# Patient Record
Sex: MALE | URBAN - NONMETROPOLITAN AREA
[De-identification: names, ages, dates, MRNs, and addresses within clinical notes are randomized per-mention and may not be internally consistent; named-entity substitution may affect disease eponyms.]

---

## 2016-03-09 PROBLEM — H25.811: Noted: 2020-03-13

## 2020-03-13 ENCOUNTER — IMPORTED ENCOUNTER (OUTPATIENT)
Dept: URBAN - NONMETROPOLITAN AREA CLINIC 1 | Facility: CLINIC | Age: 60
End: 2020-03-13

## 2020-03-13 PROCEDURE — 92004 COMPRE OPH EXAM NEW PT 1/>: CPT

## 2020-03-13 NOTE — PATIENT DISCUSSION
Cataract(s)-Visually significant cataract OD . -Cataract(s) causing symptomatic impairment of visual function not correctable with a tolerable change in glasses or contact lenses lighting or non-operative means resulting in specific activity limitations and/or participation restrictions including but not limited to reading viewing television driving or meeting vocational or recreational needs. -Expectation is clearer vision and functional improvement in symptoms as well as reduced glare disability after cataract removal.-Order IOLMaster and OPD today. -Recommend traditional surgery with Toric implant based on today's OPD testing and lifestyle questionnaire. -All questions were answered regarding surgery including pre and post-op medications appointments activity restrictions and anesthetic usage.-The risks benefits and alternatives and special risk factors for the patient were discussed in detail including but not limited to: bleeding infection retinal detachment vitreous loss problems with the implant and possible need for additional surgery.-Although rare the possibility of complete vision loss was discussed.-The possible need for glasses post-operatively was discussed.-Order medical clearance exam based on history of  DM-Patient elects to proceed with cataract surgery OD . Complex trypan extra provisc extra viscoat.; Dr's Notes: NC per Estefani Osman for his fees for surgery  Dr. Tegan Johns to talk to Dr. Neil Apt about results LRI OS Complimentary  per [employer] will pay implant fee. Insurance only/no copay for OD per Estefani Osman. JS TO TALK TO DR Bob Ha.  PT TO DECIDE IF HE WANTS SX ON THE RIGHT EYE HERE OR [place] NOT [place]IT PER Estefani Osman

## 2020-10-23 ENCOUNTER — IMPORTED ENCOUNTER (OUTPATIENT)
Dept: URBAN - NONMETROPOLITAN AREA CLINIC 1 | Facility: CLINIC | Age: 60
End: 2020-10-23

## 2020-10-23 NOTE — PATIENT DISCUSSION
Cataract(s)-Visually significant cataract OD . -Cataract(s) causing symptomatic impairment of visual function not correctable with a tolerable change in glasses or contact lenses lighting or non-operative means resulting in specific activity limitations and/or participation restrictions including but not limited to reading viewing television driving or meeting vocational or recreational needs. -Expectation is clearer vision and functional improvement in symptoms as well as reduced glare disability after cataract removal.-Order IOLMaster and OPD today. -Recommend traditional surgery with Toric implant based on today's OPD testing and lifestyle questionnaire. -All questions were answered regarding surgery including pre and post-op medications appointments activity restrictions and anesthetic usage.-The risks benefits and alternatives and special risk factors for the patient were discussed in detail including but not limited to: bleeding infection retinal detachment vitreous loss problems with the implant and possible need for additional surgery.-Although rare the possibility of complete vision loss was discussed.-The possible need for glasses post-operatively was discussed.-Order medical clearance exam based on history of  DM-Patient elects to proceed with cataract surgery OD . Complex trypan extra provisc extra viscoat.; Dr's Notes: NC per Liborio Armas for his fees for surgery  Dr. David Lan to talk to Dr. Annalisa aPul about results LRI OS Complimentary  per [employer] will pay implant fee. Insurance only/no copay for OD per Liborio Armas. JS TO TALK TO DR Robert Ash.  PT TO DECIDE IF HE WANTS SX ON THE RIGHT EYE HERE OR [place] NOT TO WAIT PER Liborio Armas

## 2020-11-02 PROBLEM — H18.40: Noted: 2020-11-02

## 2020-11-02 PROBLEM — Z96.1: Noted: 2020-11-02

## 2020-11-02 PROBLEM — H25.811: Noted: 2020-11-02

## 2020-11-02 PROBLEM — E11.9: Noted: 2020-11-02

## 2020-11-03 ENCOUNTER — IMPORTED ENCOUNTER (OUTPATIENT)
Dept: URBAN - NONMETROPOLITAN AREA CLINIC 1 | Facility: CLINIC | Age: 60
End: 2020-11-03

## 2020-11-03 PROBLEM — E11.9: Noted: 2020-11-03

## 2020-11-03 PROBLEM — H25.811: Noted: 2020-11-03

## 2020-11-03 PROBLEM — Z01.818: Noted: 2020-11-03

## 2020-11-03 NOTE — PATIENT DISCUSSION
Medical Clearance-Medical clearance done today. -No outstanding concerns that would preclude surgery.-Patient is cleared to proceed with scheduled surgery.; Dr's Notes: NC per Yvette Blizzard for his fees for surgery  Dr. Cristie Fothergill to talk to Dr. Andrez Day about results LRI OS Complimentary  per [employer] will pay implant fee. Insurance only/no copay for OD per Yvette Blizzard. JS TO TALK TO DR Kota Fletcher.  PT TO DECIDE IF HE WANTS SX ON THE RIGHT EYE HERE OR [place] NOT TO WAIT PER Yvette Blizzard

## 2020-11-13 ENCOUNTER — IMPORTED ENCOUNTER (OUTPATIENT)
Dept: URBAN - NONMETROPOLITAN AREA CLINIC 1 | Facility: CLINIC | Age: 60
End: 2020-11-13

## 2020-11-13 PROBLEM — Z98.41: Noted: 2020-11-13

## 2020-11-13 PROBLEM — Z96.1: Noted: 2020-11-13

## 2020-11-13 PROCEDURE — 99024 POSTOP FOLLOW-UP VISIT: CPT

## 2020-11-13 NOTE — PATIENT DISCUSSION
s/p PCIOL 1 Day POV CE OD Toric/Trad 11/12/20-Pt doing well s/p PCIOL. -Continue post-op gtts according to instruction sheet and sleep with eye shield over eye for 7 nights.-Avoid bending at the waist lifting anything over 5lbs and dirty or natan environments. Samples given today. IF patient needs any more samples my staff will provide him with whatever he needs. -RTC 1 week POV Dr. Feroz Joseph. s/p PCIOL OS w/ PCO OS Intraocular lens in place Clear lens no treatment needed PCO noted but no treatment needed at this time. will evaluate at 3months Continue to monitor; Dr's Notes: NC per [address] [place] [address] for his fees for surgery  Dr. Kin Welsh to talk to Dr. Mitesh Gonzalez about results LRI OS Complimentary  per [employer] will pay implant fee. Insurance only/no copay for OD per [address] [place] [address]. JS TO TALK TO DR Jesse Ambriz.  PT TO DECIDE IF HE [facility] [address] [place] [address]

## 2020-11-20 ENCOUNTER — IMPORTED ENCOUNTER (OUTPATIENT)
Dept: URBAN - NONMETROPOLITAN AREA CLINIC 1 | Facility: CLINIC | Age: 60
End: 2020-11-20

## 2020-11-20 PROCEDURE — 99024 POSTOP FOLLOW-UP VISIT: CPT

## 2020-11-20 NOTE — PATIENT DISCUSSION
s/p PCIOL-Pt doing well at 1 week s/p PCIOL. -Continue post-op gtts according to instruction sheet.-Okay to resume usual activites and d/c eye shield.; 's Notes: NC per [address] [place] [address] for his fees for surgery  Dr. Lulu Tan to talk to Dr. Randee Arana about results LRI OS Complimentary  per [employer] will pay implant fee. Insurance only/no copay for OD per [address] [place] [address]. JS TO TALK TO DR Ravin Valderrama.  PT TO DECIDE IF HE WANTS SX ON THE RIGHT EYE HERE OR [place] NOT TO WAIT PER [address] [place] [address]

## 2021-01-15 ENCOUNTER — IMPORTED ENCOUNTER (OUTPATIENT)
Dept: URBAN - NONMETROPOLITAN AREA CLINIC 1 | Facility: CLINIC | Age: 61
End: 2021-01-15

## 2021-01-15 PROBLEM — E11.9: Noted: 2021-01-15

## 2021-01-15 PROBLEM — H18.40: Noted: 2021-01-15

## 2021-01-15 PROBLEM — Z96.1: Noted: 2021-01-15

## 2021-01-15 PROCEDURE — 99212 OFFICE O/P EST SF 10 MIN: CPT

## 2021-01-15 NOTE — PATIENT DISCUSSION
Pseduophakia OU Stable on todays exam w/ intraocular lens in place OU Pt given +2.50 otc readers today to use for near vision Pt happy with visionNo further treatment needed at this time Continue to monitor DM Discussed diagnosis w. patient Reports stable sugars Stressed importance of good blood sugar control No BDR noted on todays exam Continue tomonitor; Dr's Notes: NC per Cinthia Arreola for his fees for surgery  Dr. Linda Thurman to talk to Dr. Cosme Torre about results LRI OS Complimentary  per [employer] will pay implant fee. Insurance only/no copay for OD per Cinthia Arreola. JS TO TALK TO DR Bruce Calvo.  PT TO DECIDE IF HE WANTS SX ON THE RIGHT EYE HERE OR [place] NOT TO WAIT PER Cinthia Arreola

## 2022-04-16 ASSESSMENT — TONOMETRY
OD_IOP_MMHG: 15
OS_IOP_MMHG: 13
OD_IOP_MMHG: 22
OS_IOP_MMHG: 15
OD_IOP_MMHG: 15
OD_IOP_MMHG: 13
OS_IOP_MMHG: 15

## 2022-04-16 ASSESSMENT — VISUAL ACUITY
OS_GLARE: 20/30
OS_CC: 20/30
OS_PH: 20/30
OD_GLARE: 20/100
OD_CC: 20/25
OU_CC: J1+
OD_CC: CF2'
OS_CC: 20/30
OS_PH: 20/25
OS_CC: 20/40
OS_CC: 20/30
OD_PH: 20/200
OD_PAM: 20/30
OD_CC: CF2'
OD_CC: 20/20-1
OD_CC: 20/30
OD_CC: 20/20
OD_PH: 20/60

## 2024-07-03 ENCOUNTER — OFFICE VISIT (OUTPATIENT)
Age: 64
End: 2024-07-03
Payer: COMMERCIAL

## 2024-07-03 VITALS
WEIGHT: 203 LBS | DIASTOLIC BLOOD PRESSURE: 84 MMHG | OXYGEN SATURATION: 99 % | SYSTOLIC BLOOD PRESSURE: 144 MMHG | HEART RATE: 125 BPM

## 2024-07-03 DIAGNOSIS — R00.0 TACHYCARDIA: ICD-10-CM

## 2024-07-03 DIAGNOSIS — I45.4 IVCD (INTRAVENTRICULAR CONDUCTION DEFECT): ICD-10-CM

## 2024-07-03 DIAGNOSIS — R03.0 PREHYPERTENSION: ICD-10-CM

## 2024-07-03 DIAGNOSIS — R01.1 MURMUR, CARDIAC: ICD-10-CM

## 2024-07-03 DIAGNOSIS — R03.0 PREHYPERTENSION: Primary | ICD-10-CM

## 2024-07-03 DIAGNOSIS — R42 DIZZINESS: ICD-10-CM

## 2024-07-03 DIAGNOSIS — E11.9 TYPE 2 DIABETES MELLITUS WITHOUT COMPLICATION, WITHOUT LONG-TERM CURRENT USE OF INSULIN (HCC): ICD-10-CM

## 2024-07-03 PROCEDURE — 93000 ELECTROCARDIOGRAM COMPLETE: CPT | Performed by: INTERNAL MEDICINE

## 2024-07-03 PROCEDURE — 99204 OFFICE O/P NEW MOD 45 MIN: CPT | Performed by: INTERNAL MEDICINE

## 2024-07-03 RX ORDER — ASPIRIN 81 MG/1
81 TABLET ORAL DAILY
Qty: 90 TABLET | Refills: 0 | Status: SHIPPED | OUTPATIENT
Start: 2024-07-03

## 2024-07-03 ASSESSMENT — PATIENT HEALTH QUESTIONNAIRE - PHQ9
SUM OF ALL RESPONSES TO PHQ QUESTIONS 1-9: 0
2. FEELING DOWN, DEPRESSED OR HOPELESS: NOT AT ALL
SUM OF ALL RESPONSES TO PHQ9 QUESTIONS 1 & 2: 0
SUM OF ALL RESPONSES TO PHQ QUESTIONS 1-9: 0
DEPRESSION UNABLE TO ASSESS: FUNCTIONAL CAPACITY MOTIVATION LIMITS ACCURACY
SUM OF ALL RESPONSES TO PHQ QUESTIONS 1-9: 0
SUM OF ALL RESPONSES TO PHQ QUESTIONS 1-9: 0
1. LITTLE INTEREST OR PLEASURE IN DOING THINGS: NOT AT ALL

## 2024-07-03 ASSESSMENT — ENCOUNTER SYMPTOMS
EYES NEGATIVE: 1
GASTROINTESTINAL NEGATIVE: 1
RESPIRATORY NEGATIVE: 1

## 2024-07-03 NOTE — PROGRESS NOTES
Extended cardiac holter monitor (3 days - 15 days); Future  -     TSH; Future    IVCD (intraventricular conduction defect)  -     aspirin 81 MG EC tablet; Take 1 tablet by mouth daily    Murmur, cardiac  -     Echo (TTE) complete (PRN contrast/bubble/strain/3D); Future    Type 2 diabetes mellitus without complication, without long-term current use of insulin (HCC)  -     Hepatic Function Panel; Future  -     Basic Metabolic Panel; Future  -     CBC; Future  -     Lipid Panel; Future  -     TSH; Future  -     Hemoglobin A1C; Future          See patient instructions also for other medical advice given    There are no discontinued medications.    Follow-up and Dispositions    Return in about 4 weeks (around 7/31/2024), or if symptoms worsen or fail to improve, for post test/procedure.           Return to ER if any significant CP not relieved by s/l NTG, severe SOB, severe palpitations, loss of consciousness    7/3/2024  Plan for medicines : History of tachycardia today and bradycardia few days ago when blood pressure was not recordable for a couple of hours.  He had salty water and blood pressure increased at home and did not call 911.  EKG shows IVCD and likely sinus tachycardia with first-degree AV block.  He may have sick sinus syndrome.  Has a murmur with possible mitral regurgitation.  Check Holter and echo.  Routine labs including lipids and A1c as well as BMP LFT TSH and CBC.  Will discuss with PCP also.  Exercise Plan: Moderate activity and avoid heavy exercise till testing is done.  Diet plan: Mediterranean diet guidelines explained     Recommend that he should go to ER if he develops significant bradycardia or blood pressure becomes low.

## 2024-07-15 PROBLEM — R00.1 SYMPTOMATIC BRADYCARDIA: Status: ACTIVE | Noted: 2024-07-15

## 2024-07-15 PROBLEM — I44.2 THIRD DEGREE HEART BLOCK (HCC): Status: ACTIVE | Noted: 2024-07-15

## 2024-08-02 ENCOUNTER — OFFICE VISIT (OUTPATIENT)
Age: 64
End: 2024-08-02

## 2024-08-02 VITALS
BODY MASS INDEX: 30.46 KG/M2 | HEART RATE: 81 BPM | DIASTOLIC BLOOD PRESSURE: 65 MMHG | OXYGEN SATURATION: 99 % | SYSTOLIC BLOOD PRESSURE: 132 MMHG | WEIGHT: 201 LBS | HEIGHT: 68 IN

## 2024-08-02 DIAGNOSIS — I44.2 COMPLETE HEART BLOCK (HCC): ICD-10-CM

## 2024-08-02 DIAGNOSIS — I48.92 ATRIAL FLUTTER, UNSPECIFIED TYPE (HCC): Primary | ICD-10-CM

## 2024-08-02 DIAGNOSIS — E11.9 TYPE 2 DIABETES MELLITUS WITHOUT COMPLICATION, WITHOUT LONG-TERM CURRENT USE OF INSULIN (HCC): ICD-10-CM

## 2024-08-02 DIAGNOSIS — I10 PRIMARY HYPERTENSION: ICD-10-CM

## 2024-08-02 DIAGNOSIS — Z95.0 CARDIAC PACEMAKER IN SITU: ICD-10-CM

## 2024-08-02 ASSESSMENT — PATIENT HEALTH QUESTIONNAIRE - PHQ9
SUM OF ALL RESPONSES TO PHQ QUESTIONS 1-9: 0
DEPRESSION UNABLE TO ASSESS: FUNCTIONAL CAPACITY MOTIVATION LIMITS ACCURACY
SUM OF ALL RESPONSES TO PHQ QUESTIONS 1-9: 0
1. LITTLE INTEREST OR PLEASURE IN DOING THINGS: NOT AT ALL
SUM OF ALL RESPONSES TO PHQ9 QUESTIONS 1 & 2: 0
2. FEELING DOWN, DEPRESSED OR HOPELESS: NOT AT ALL

## 2024-08-02 ASSESSMENT — ENCOUNTER SYMPTOMS
GASTROINTESTINAL NEGATIVE: 1
EYES NEGATIVE: 1
RESPIRATORY NEGATIVE: 1

## 2024-08-02 NOTE — PROGRESS NOTES
1. Have you been to the ER, urgent care clinic since your last visit?  Hospitalized since your last visit?     No      2.  Where do you normally have your labs drawn?   PCP    3. Do you need any refills today?   No    4. Which local pharmacy do you use (enter pharmacy)?   [facility]    5. Which mail order pharmacy do you use (enter pharmacy)?   No     6. Are you here for surgical clearance and if so who will be doing your     procedure/surgery (care team)?   No      
\"VLDL\"        7/3/2024  Plan for medicines : History of tachycardia today and bradycardia few days ago when blood pressure was not recordable for a couple of hours.  He had salty water and blood pressure increased at home and did not call 911.  EKG shows IVCD and likely sinus tachycardia with first-degree AV block.  He may have sick sinus syndrome.  Has a murmur with possible mitral regurgitation.  Check Holter and echo.  Routine labs including lipids and A1c as well as BMP LFT TSH and CBC.  Will discuss with PCP also.  Exercise Plan: Moderate activity and avoid heavy exercise till testing is done.  Diet plan: Mediterranean diet guidelines explained     Recommend that he should go to ER if he develops significant bradycardia or blood pressure becomes low.    Ej was seen today for follow-up.    Diagnoses and all orders for this visit:    Atrial flutter, unspecified type (HCC)    Complete heart block (HCC)    Cardiac pacemaker in situ  -     PROGRAM EVAL (IN PERSON) IMPLANT DEVICE,PACEMAKER,MULT LEAD    Type 2 diabetes mellitus without complication, without long-term current use of insulin (HCC)    Primary hypertension          See patient instructions also for other medical advice given    There are no discontinued medications.    Follow-up and Dispositions    Return in about 3 months (around 11/2/2024), or if symptoms worsen or fail to improve.           Return to ER if any significant CP not relieved by s/l NTG, severe SOB, severe palpitations, loss of consciousness    8/2/2024  Plan for medicines : Continue current cardiac medications.  Continue anticoagulation.  Will watch for recurrence of atrial flutter and consider antiarrhythmics in future if needed.  Blood pressure is controlled.  Exercise Plan: Regular activity walking 30 to 40 minutes a day  Diet plan: Mediterranean diet guidelines reinforced.

## 2024-11-25 ENCOUNTER — OFFICE VISIT (OUTPATIENT)
Age: 64
End: 2024-11-25

## 2024-11-25 VITALS
OXYGEN SATURATION: 98 % | HEART RATE: 94 BPM | BODY MASS INDEX: 29.1 KG/M2 | DIASTOLIC BLOOD PRESSURE: 86 MMHG | WEIGHT: 192 LBS | SYSTOLIC BLOOD PRESSURE: 151 MMHG | HEIGHT: 68 IN

## 2024-11-25 DIAGNOSIS — I49.5 SSS (SICK SINUS SYNDROME) (HCC): ICD-10-CM

## 2024-11-25 DIAGNOSIS — I25.10 CORONARY ARTERY DISEASE INVOLVING NATIVE CORONARY ARTERY OF NATIVE HEART WITHOUT ANGINA PECTORIS: ICD-10-CM

## 2024-11-25 DIAGNOSIS — I44.2 COMPLETE HEART BLOCK (HCC): ICD-10-CM

## 2024-11-25 DIAGNOSIS — I48.92 ATRIAL FLUTTER, UNSPECIFIED TYPE (HCC): ICD-10-CM

## 2024-11-25 DIAGNOSIS — I10 PRIMARY HYPERTENSION: ICD-10-CM

## 2024-11-25 DIAGNOSIS — Z95.0 CARDIAC PACEMAKER IN SITU: Primary | ICD-10-CM

## 2024-11-25 PROCEDURE — 93296 REM INTERROG EVL PM/IDS: CPT | Performed by: INTERNAL MEDICINE

## 2024-11-25 PROCEDURE — 93294 REM INTERROG EVL PM/LDLS PM: CPT | Performed by: INTERNAL MEDICINE

## 2024-11-25 RX ORDER — METOPROLOL SUCCINATE 25 MG/1
25 TABLET, EXTENDED RELEASE ORAL DAILY
Qty: 90 TABLET | Refills: 1 | Status: SHIPPED | OUTPATIENT
Start: 2024-11-25 | End: 2024-11-25 | Stop reason: SDUPTHER

## 2024-11-25 RX ORDER — METOPROLOL SUCCINATE 25 MG/1
25 TABLET, EXTENDED RELEASE ORAL DAILY
Qty: 90 TABLET | Refills: 1 | Status: SHIPPED | OUTPATIENT
Start: 2024-11-25

## 2024-11-25 RX ORDER — ATORVASTATIN CALCIUM 10 MG/1
10 TABLET, FILM COATED ORAL DAILY
Qty: 90 TABLET | Refills: 3 | Status: SHIPPED | OUTPATIENT
Start: 2024-11-25

## 2024-11-25 ASSESSMENT — ENCOUNTER SYMPTOMS
EYES NEGATIVE: 1
RESPIRATORY NEGATIVE: 1
GASTROINTESTINAL NEGATIVE: 1

## 2024-11-25 NOTE — PROGRESS NOTES
Ej Collier is a 63 y.o. year old male.    7/3/2023 seen as a new patient for bradycardia and low blood pressure.  He is a friend of mine who called with heart rate in 30s and blood pressure was not recordable at home.  He had cold hands and feet but did not feel any chest pain short of breath.  Salt water was tried and after some time heart rate went up to 100 and blood pressure increased to 150s/70s. Dizziness on standing occasionally once in a couple of months since he had COVID 2021.  Seems to be better since he started eating some food more regularly.  No chest pain shortness of breath.  8/2/2024 had to go to ER because of severe bradycardia and found to have atrial flutter with complete heart block.  Received permanent pacemaker and has been asymptomatic since.  Wound is healing well.  Discharge summary from [facility] reviewed.  11/25/2024 denies chest pain shortness of breath edema palpitations.  Blood pressure yesterday in the pharmacy was 109/69.          Review of Systems   Constitutional: Negative.    HENT: Negative.     Eyes: Negative.    Respiratory: Negative.     Cardiovascular: Negative.    Gastrointestinal: Negative.    Endocrine: Negative.    Genitourinary: Negative.    Musculoskeletal: Negative.    Neurological: Negative.  Negative for dizziness.   Psychiatric/Behavioral: Negative.     All other systems reviewed and are negative.        Physical Exam  Vitals and nursing note reviewed.   Constitutional:       Appearance: Normal appearance.   HENT:      Head: Normocephalic and atraumatic.      Nose: Nose normal.   Eyes:      Conjunctiva/sclera: Conjunctivae normal.   Cardiovascular:      Rate and Rhythm: Regular rhythm. Tachycardia present.      Pulses: Normal pulses.      Heart sounds: Murmur heard.      High-pitched blowing midsystolic murmur is present at the apex.   Pulmonary:      Effort: Pulmonary effort is normal.      Breath sounds: Normal breath sounds.   Abdominal:      General:

## 2024-11-25 NOTE — PROGRESS NOTES
1. Have you been to the ER, urgent care clinic since your last visit?  Hospitalized since your last visit? NO     2.  Where do you normally have your labs drawn?  PCP office    3. Do you need any refills today? NO    4. Which local pharmacy do you use (enter pharmacy)?   [facility]     5. Which mail order pharmacy do you use (enter pharmacy)?   NO     6. Are you here for surgical clearance and if so who will be doing your     procedure/surgery (care team)?   NO

## 2024-11-29 ENCOUNTER — NURSE ONLY (OUTPATIENT)
Age: 64
End: 2024-11-29
Payer: COMMERCIAL

## 2024-11-29 DIAGNOSIS — Z95.0 CARDIAC PACEMAKER IN SITU: Primary | ICD-10-CM

## 2024-11-29 DIAGNOSIS — I48.92 ATRIAL FLUTTER, UNSPECIFIED TYPE (HCC): ICD-10-CM

## 2024-12-03 NOTE — RESULT ENCOUNTER NOTE
Agree with the findings.  Pacemaker function is normal.    EGM's reviewed of the episodes.  Most of these are 121 indicating SVT in 130s some are 2-1 AV block's but that is indicated by markers only.  Continue anticoagulation.  Continue monitoring.

## 2025-02-13 DIAGNOSIS — I25.10 CORONARY ARTERY DISEASE INVOLVING NATIVE CORONARY ARTERY OF NATIVE HEART WITHOUT ANGINA PECTORIS: ICD-10-CM

## 2025-05-02 ENCOUNTER — OFFICE VISIT (OUTPATIENT)
Age: 65
End: 2025-05-02

## 2025-05-02 VITALS
HEART RATE: 82 BPM | BODY MASS INDEX: 28.95 KG/M2 | HEIGHT: 68 IN | WEIGHT: 191 LBS | OXYGEN SATURATION: 98 % | DIASTOLIC BLOOD PRESSURE: 64 MMHG | SYSTOLIC BLOOD PRESSURE: 157 MMHG

## 2025-05-02 DIAGNOSIS — E78.5 DYSLIPIDEMIA: ICD-10-CM

## 2025-05-02 DIAGNOSIS — E11.9 TYPE 2 DIABETES MELLITUS WITHOUT COMPLICATION, WITHOUT LONG-TERM CURRENT USE OF INSULIN (HCC): ICD-10-CM

## 2025-05-02 DIAGNOSIS — I48.92 ATRIAL FLUTTER, UNSPECIFIED TYPE (HCC): ICD-10-CM

## 2025-05-02 DIAGNOSIS — R00.1 SYMPTOMATIC BRADYCARDIA: ICD-10-CM

## 2025-05-02 DIAGNOSIS — I25.10 CORONARY ARTERY DISEASE INVOLVING NATIVE CORONARY ARTERY OF NATIVE HEART WITHOUT ANGINA PECTORIS: Primary | ICD-10-CM

## 2025-05-02 DIAGNOSIS — I10 PRIMARY HYPERTENSION: ICD-10-CM

## 2025-05-02 DIAGNOSIS — I49.5 SSS (SICK SINUS SYNDROME) (HCC): ICD-10-CM

## 2025-05-02 DIAGNOSIS — I44.2 COMPLETE HEART BLOCK (HCC): ICD-10-CM

## 2025-05-02 DIAGNOSIS — Z95.0 CARDIAC PACEMAKER IN SITU: ICD-10-CM

## 2025-05-02 ASSESSMENT — PATIENT HEALTH QUESTIONNAIRE - PHQ9
SUM OF ALL RESPONSES TO PHQ QUESTIONS 1-9: 0
2. FEELING DOWN, DEPRESSED OR HOPELESS: NOT AT ALL
SUM OF ALL RESPONSES TO PHQ QUESTIONS 1-9: 0
1. LITTLE INTEREST OR PLEASURE IN DOING THINGS: NOT AT ALL

## 2025-05-02 ASSESSMENT — ENCOUNTER SYMPTOMS
GASTROINTESTINAL NEGATIVE: 1
EYES NEGATIVE: 1
RESPIRATORY NEGATIVE: 1

## 2025-05-02 NOTE — PROGRESS NOTES
1. Have you been to the ER, urgent care clinic since your last visit?  Hospitalized since your last visit?     No      2.  Where do you normally have your labs drawn?   PCP    3. Do you need any refills today?   No    4. Which local pharmacy do you use (enter pharmacy)?   [facility]    5. Which mail order pharmacy do you use (enter pharmacy)?   No     6. Are you here for surgical clearance and if so who will be doing your     procedure/surgery (care team)?   No      
a reminder for a \"due or due soon\" health maintenance. I have asked that he contact his primary care provider for follow-up on this health maintenance.    7/16/2024 echo  A complete two-dimensional transthoracic echocardiogram was performed (2D,  M-mode, Doppler and color flow Doppler).  The study was technically difficult.  Definity used during exam.     The left ventricle is normal in size with mild concentric left ventricular  hypertrophy.  Left ventricular systolic function is mildly reduced with a calculated  left ventricular ejection fraction of 44% by Simpsons biplane.  There is near akinesis of the anteroseptal and inferoseptal wall.  Diastolic filling pattern is indeterminate.  The right ventricle is grossly normal size.  Right ventricular systolic function is normal: TAPSE: 2.6cm.  Unable to determine the right ventricular systolic pressure due to lack of  measurable tricuspid regurgitation.  No significant valvular pathology.     No previous study for comparison.    8/29/2024 nuclear stress    * The patient was tested using the Lexiscan protocol for a duration of 00:51 mm:ss and achieved 1.0 METs. A maximum heart rate of 117 bpm with a target predicted heart rate of 87% was obtained at 01:40. A maximum systolic blood pressure of 155/84 was obtained at 01:41 and a maximum diastolic blood pressure of 155/84 was obtained at 01:41. A maximum ST depression of -1.1 mm in V6 occurred at 01:30. A maximum ST elevation of +2.2 mm in V3 occurred at 01:40. Paced rhythm throughout the procedure. AWAIT NUCLEAR IMAGES.     * Mostly nonreversible perfusion defect involving the anteroseptal and mid and basal segments as well as the apical segment is consistent with an infarct. There is some reversibility of the mid segment, highly suspicious for a small ischemic penumbra.     * Dr. Gray notified at 1307 on 8/29/2024.     ASSESSMENT & PLAN    No results found for: \"CHOL\"  No results found for: \"TRIG\"  No results found

## 2025-05-02 NOTE — PATIENT INSTRUCTIONS
The medication list included in this document is our record of what you are currently taking, including any changes that were made at today's visit.  If you find any differences when compared to your medications at home, or have any questions that were not answered at your visit, please contact the office.    please get remote checks for pacer or ICD every 3 months and Office check in 1 yr  Please call our office after every transmission to confirm success of transmission

## 2025-05-12 DIAGNOSIS — I48.92 ATRIAL FLUTTER, UNSPECIFIED TYPE (HCC): ICD-10-CM

## 2025-05-12 RX ORDER — METOPROLOL SUCCINATE 25 MG/1
25 TABLET, EXTENDED RELEASE ORAL DAILY
Qty: 90 TABLET | Refills: 3 | Status: SHIPPED | OUTPATIENT
Start: 2025-05-12